# Patient Record
Sex: FEMALE
[De-identification: names, ages, dates, MRNs, and addresses within clinical notes are randomized per-mention and may not be internally consistent; named-entity substitution may affect disease eponyms.]

---

## 2019-08-12 PROBLEM — Z00.00 ENCOUNTER FOR PREVENTIVE HEALTH EXAMINATION: Status: ACTIVE | Noted: 2019-08-12

## 2019-08-28 ENCOUNTER — APPOINTMENT (OUTPATIENT)
Dept: VASCULAR SURGERY | Facility: CLINIC | Age: 39
End: 2019-08-28
Payer: COMMERCIAL

## 2019-08-28 VITALS
HEART RATE: 76 BPM | SYSTOLIC BLOOD PRESSURE: 104 MMHG | DIASTOLIC BLOOD PRESSURE: 73 MMHG | WEIGHT: 115 LBS | HEIGHT: 66 IN | OXYGEN SATURATION: 99 % | BODY MASS INDEX: 18.48 KG/M2 | TEMPERATURE: 99 F

## 2019-08-28 DIAGNOSIS — I83.893 VARICOSE VEINS OF BILATERAL LOWER EXTREMITIES WITH OTHER COMPLICATIONS: ICD-10-CM

## 2019-08-28 DIAGNOSIS — L97.325 VARICOSE VEINS OF LEFT LOWER EXTREMITY WITH BOTH ULCER OF ANKLE AND INFLAMMATION: ICD-10-CM

## 2019-08-28 DIAGNOSIS — Z82.3 FAMILY HISTORY OF STROKE: ICD-10-CM

## 2019-08-28 DIAGNOSIS — Z82.61 FAMILY HISTORY OF ARTHRITIS: ICD-10-CM

## 2019-08-28 DIAGNOSIS — Z72.89 OTHER PROBLEMS RELATED TO LIFESTYLE: ICD-10-CM

## 2019-08-28 DIAGNOSIS — Z80.8 FAMILY HISTORY OF MALIGNANT NEOPLASM OF OTHER ORGANS OR SYSTEMS: ICD-10-CM

## 2019-08-28 DIAGNOSIS — Z82.49 FAMILY HISTORY OF ISCHEMIC HEART DISEASE AND OTHER DISEASES OF THE CIRCULATORY SYSTEM: ICD-10-CM

## 2019-08-28 DIAGNOSIS — I83.223 VARICOSE VEINS OF LEFT LOWER EXTREMITY WITH BOTH ULCER OF ANKLE AND INFLAMMATION: ICD-10-CM

## 2019-08-28 DIAGNOSIS — Z80.3 FAMILY HISTORY OF MALIGNANT NEOPLASM OF BREAST: ICD-10-CM

## 2019-08-28 PROCEDURE — 99243 OFF/OP CNSLTJ NEW/EST LOW 30: CPT | Mod: 25

## 2019-08-28 PROCEDURE — 93970 EXTREMITY STUDY: CPT

## 2019-08-28 NOTE — ASSESSMENT
[Arterial/Venous Disease] : arterial/venous disease [FreeTextEntry1] : Vascular surgeon discussed with the patient:\par Varicose veins are enlarged, twisted veins. Varicose veins are caused by increased blood pressure in the veins.  The blood moves towards the heart by 1-way valves in the veins. When the valves become weakened or damaged, blood can collect in the veins. This causes the veins to become enlarged. Sitting or standing for long periods can cause blood to pool in the leg veins, increasing the pressure within the veins. The veins can stretch from the increased pressure. This may weaken the walls of the veins and damage the valves.\par Varicose veins may be more common in some families (inherited).  Factors that may increase pressure include:  obesity, older age, being female, pregnancy, taking oral contraceptive pills or hormone replacement, being inactive, and/or smoking. \par The most common symptoms of varicose veins are sensations in the legs, such as a heavy feeling, burning, and/or aching. However, each individual may experience symptoms differently.  Other symptoms may include:  color changes in the skin, sores on the legs, or rash.  Severe varicose veins may eventually produce long-term mild swelling that can result in more serious skin and tissue problems, such as ulcers and non-healing sores.\par Varicose veins are diagnosed by a complete medical history, physical examination, and diagnostic studies for varicose veins including duplex ultrasound and color-flow imaging.  \par Medical treatment for varicose veins include:  compression stockings, sclerotherapy, endovenous laser ablation and/or surgical treatment microphlebectomy.  \par \par  [Other: _____] : [unfilled]

## 2019-08-28 NOTE — DATA REVIEWED
[FreeTextEntry1] : B/L VLE performed byu ANGELICAT Penalo - below knee left gsv reflux and branch varicose veins

## 2019-08-28 NOTE — PROCEDURE
[FreeTextEntry1] : pt to consider treatment options as discussed - will call to schedule. rx given for thigh high support hose 20-30 mmHg

## 2019-08-28 NOTE — ADDENDUM
[FreeTextEntry1] : CEAP Classification:\par []     C0----No visible or palpable signs of venous disease\par []     C1----Telangiectasia or reticular veins\par x    C2----Varicose veins; distinguished from reticular veins by a diameter of 3mm or more.\par []     C3----Edema\par []     K9y--Luzyaqb in skin and subcutaneous tissues secondary to CVD---Pigmentation or eczema\par []     C0o--Zcsgyrd in skin and subcutaneous tissues secondary to CVD---Lipodermatosclerosis or atrophic marzena\par []     C5----Healed venous ulcer\par []     C6----Active venous ulcer\par x    S ----Symptoms including ache, pain, tightness, skin irritation, heaviness, muscle cramps, and other venous dysfunction\par []     A ----Asymptomatic\par CEAP Sclore  =  C2, S\par \par \par Attribute                            Absent=0                    Mild=1                                   Moderate=2                                           Severe=3\par 1         Pain                          None                          Occasional                           Daily, moderate                                      Daily, severe\par          \par 2        Varicose Veins         None                          Few: branch VV                  Multiple: GS VV                                      Extensive: GS & LS   \par \par 1        Venous Edema         None                         Evening ankle only                Afternoon above ankle                          Morning above ankle\par \par 0        Skin Pigmentation      None or low             Diffuse, limited, old brown    Diffuse, lower 1/3, recent pigment        Above lower 1/3, and recent pigment\par \par  0       Inflammation              None                        Mild cellulitis                           Moderate cellulitis, lower 1/3                 Severe cellulitis, lower 1/3 & above\par \par 0        Induration                  None                        Focal (<5 cm)                        Medial or lateral, < lower 1/3                  Entire lower 1/3   \par \par 0        No.of active ulcers   0                              1                                             2                                                             >2\par \par 0        Active ulceration      None                       < 3 months                             > 3 months, < 1 year                              Not healed > 1 year\par \par   0      Active ulcer, size     None                       < 2 cm diameter                     2-6 cm diameter                                      > 6 cm diameter      \par \par 0        Compressive tx        No use/compliant    Intermittent use                      Wears most days                                   Full compliance\par \par \par   4    TOTAL (max 30 pts) Venous Clinical Severity Score\par

## 2019-08-28 NOTE — PHYSICAL EXAM
[JVD] : no jugular venous distention  [2+] : left 2+ [Ankle Swelling (On Exam)] : not present [Ankle Swelling Bilaterally] : bilaterally  [Varicose Veins Of Lower Extremities] : present [Varicose Veins Of The Left Leg] : of the left leg [Ankle Swelling On The Left] : moderate [No Rash or Lesion] : No rash or lesion [] : bilaterally [Petechiae] : no petechiae [Purpura] : no purpura  [Skin Ulcer] : no ulcer [Skin Induration] : no induration [Alert] : alert [Oriented to Person] : oriented to person [Oriented to Place] : oriented to place [Oriented to Time] : oriented to time [Calm] : calm [de-identified] : thin wdwn nad [de-identified] : wnl [de-identified] : surekhal [FreeTextEntry1] : dilated branch varicose veins left calf and lateral thigh [de-identified] : wnl [de-identified] : as above

## 2019-08-28 NOTE — REASON FOR VISIT
[Initial Evaluation] : an initial evaluation [FreeTextEntry1] : she was referred by her dermatologist, Dr. Ricki Phillips, for evaluation of dilated branch varicose veins and superficial branch varicose veins left calf.  Patient with c/o increase in left lower leg/calf throbbing and pressure over the past 18 months.  She does not wear compression stockings, no history of venous treatment and has a ++ significant family history of varicose veins - mother and maternal grandfather.  B/L VLE today confirms no dvt, no svt bilateral leg, no reflux right gsv and + reflux limited to below knee left gsv.  She has dilated branch varicose vein left calf and superficial branch varicose veins left lateral thigh.  Recommend thigh high medical grade 20-30 mmHg compression stockings to be worn daily and during flights.  Provided patient with prescription.  Discussed with patient treatment for left gsv reflux - EVLT procedure, dilated branch varicose veins left calf - microphlebectomy procedure and sclerotherapy procedure for superficial branch varicose veins left thigh.  She is not currently pregnant but planning for pregnancy in the future.   Discussed with patient we do not do elective procedure if she is pregnant or breast feeding.  She verbalized understanding and states she will think about options and call when she is ready to schedule.  Rx given for thigh high support hose 20-30 mmHg. Also discussed importance of wearing support hose during pregnancy.

## 2019-12-04 ENCOUNTER — APPOINTMENT (OUTPATIENT)
Dept: VASCULAR SURGERY | Facility: CLINIC | Age: 39
End: 2019-12-04
Payer: COMMERCIAL

## 2019-12-04 VITALS
TEMPERATURE: 98.1 F | HEART RATE: 78 BPM | OXYGEN SATURATION: 100 % | SYSTOLIC BLOOD PRESSURE: 101 MMHG | DIASTOLIC BLOOD PRESSURE: 66 MMHG

## 2019-12-04 DIAGNOSIS — I87.2 VENOUS INSUFFICIENCY (CHRONIC) (PERIPHERAL): ICD-10-CM

## 2019-12-04 DIAGNOSIS — I83.812 VARICOSE VEINS OF LEFT LOWER EXTREMITY WITH PAIN: ICD-10-CM

## 2019-12-04 DIAGNOSIS — R25.2 CRAMP AND SPASM: ICD-10-CM

## 2019-12-04 DIAGNOSIS — I83.12 VARICOSE VEINS OF LEFT LOWER EXTREMITY WITH INFLAMMATION: ICD-10-CM

## 2019-12-04 DIAGNOSIS — I83.892 VARICOSE VEINS OF LEFT LOWER EXTREMITY WITH OTHER COMPLICATIONS: ICD-10-CM

## 2019-12-04 PROCEDURE — 99214 OFFICE O/P EST MOD 30 MIN: CPT

## 2019-12-04 NOTE — REASON FOR VISIT
[Follow-Up: _____] : a [unfilled] follow-up visit [FreeTextEntry1] : She is here for reevaluation of compression stocking therapy.  She has been wearing thigh high 20-30 mmHg compression stockings without significant relief of left leg/calf venous reflux symptoms.  At her initial office visit with VLE on 8/28/19, discussed evlt proceure for left gsv reflux, microphlebectomy of dilated branch varicose veins and sclerotherapy for superficial branch varicose veins.  She would benefit from left gsv evlt procedure and will schedule procedure.

## 2019-12-04 NOTE — PHYSICAL EXAM
[JVD] : no jugular venous distention  [Ankle Swelling (On Exam)] : not present [2+] : left 2+ [Varicose Veins Of Lower Extremities] : present [Ankle Swelling Bilaterally] : bilaterally  [Varicose Veins Of The Left Leg] : of the left leg [Ankle Swelling On The Left] : moderate [] : bilaterally [No Rash or Lesion] : No rash or lesion [Purpura] : no purpura  [Petechiae] : no petechiae [Skin Ulcer] : no ulcer [Skin Induration] : no induration [Alert] : alert [Oriented to Person] : oriented to person [Oriented to Place] : oriented to place [Oriented to Time] : oriented to time [de-identified] : thin wdwn nad [Calm] : calm [de-identified] : surekhal [FreeTextEntry1] : dilated branch varicose veins left calf and lateral thigh [de-identified] : wnl [de-identified] : wnl [de-identified] : as above

## 2019-12-04 NOTE — PROCEDURE
[FreeTextEntry1] : pt has not had any significant relief of left leg CVI sx even with use of daily medical grade support hose - pt will benefit from definitive therapy with left leg gsv evlt

## 2019-12-11 ENCOUNTER — APPOINTMENT (OUTPATIENT)
Dept: VASCULAR SURGERY | Facility: CLINIC | Age: 39
End: 2019-12-11

## 2019-12-18 ENCOUNTER — APPOINTMENT (OUTPATIENT)
Dept: VASCULAR SURGERY | Facility: CLINIC | Age: 39
End: 2019-12-18